# Patient Record
Sex: MALE | Race: WHITE
[De-identification: names, ages, dates, MRNs, and addresses within clinical notes are randomized per-mention and may not be internally consistent; named-entity substitution may affect disease eponyms.]

---

## 2017-07-14 ENCOUNTER — HOSPITAL ENCOUNTER (OUTPATIENT)
Dept: HOSPITAL 19 - COL.VAS | Age: 52
End: 2017-07-14
Payer: COMMERCIAL

## 2017-07-14 DIAGNOSIS — F17.200: ICD-10-CM

## 2017-07-14 DIAGNOSIS — I73.9: Primary | ICD-10-CM

## 2019-03-24 ENCOUNTER — HOSPITAL ENCOUNTER (INPATIENT)
Dept: HOSPITAL 19 - COL.ER | Age: 54
LOS: 3 days | Discharge: HOME | DRG: 871 | End: 2019-03-27
Payer: COMMERCIAL

## 2019-03-24 VITALS — TEMPERATURE: 98.3 F | DIASTOLIC BLOOD PRESSURE: 64 MMHG | HEART RATE: 108 BPM | SYSTOLIC BLOOD PRESSURE: 124 MMHG

## 2019-03-24 VITALS — TEMPERATURE: 97.5 F | HEART RATE: 89 BPM | SYSTOLIC BLOOD PRESSURE: 91 MMHG | DIASTOLIC BLOOD PRESSURE: 57 MMHG

## 2019-03-24 VITALS — HEIGHT: 67.01 IN | WEIGHT: 211.2 LBS | BODY MASS INDEX: 33.15 KG/M2

## 2019-03-24 DIAGNOSIS — K44.9: ICD-10-CM

## 2019-03-24 DIAGNOSIS — A41.9: Primary | ICD-10-CM

## 2019-03-24 DIAGNOSIS — Z88.2: ICD-10-CM

## 2019-03-24 DIAGNOSIS — Z87.891: ICD-10-CM

## 2019-03-24 DIAGNOSIS — Z86.718: ICD-10-CM

## 2019-03-24 DIAGNOSIS — J12.3: ICD-10-CM

## 2019-03-24 DIAGNOSIS — R19.7: ICD-10-CM

## 2019-03-24 DIAGNOSIS — J96.01: ICD-10-CM

## 2019-03-24 LAB
ALBUMIN SERPL-MCNC: 4.1 GM/DL (ref 3.5–5)
ALP SERPL-CCNC: 90 U/L (ref 50–136)
ALT SERPL-CCNC: 28 U/L (ref 21–72)
ANION GAP SERPL CALC-SCNC: 11 MMOL/L (ref 7–16)
AST SERPL-CCNC: 26 U/L (ref 15–37)
BASE EXCESS BLDA CALC-SCNC: -4.3 MMOL/L (ref -2–2)
BILIRUB SERPL-MCNC: 1.3 MG/DL (ref 0–1)
BUN SERPL-MCNC: 21 MG/DL (ref 9–20)
CALCIUM SERPL-MCNC: 9.1 MG/DL (ref 8.4–10.2)
CHLORIDE SERPL-SCNC: 104 MMOL/L (ref 98–107)
CO2 BLDA-SCNC: 19.8 MMOL/L
CO2 SERPL-SCNC: 22 MMOL/L (ref 22–30)
CREAT SERPL-SCNC: 0.89 MG/DL (ref 0.66–1.25)
CRP SERPL-MCNC: 16.5 MG/DL (ref 0–0.9)
EOSINOPHIL NFR BLD: 1 % (ref 0–4)
ERYTHROCYTE [DISTWIDTH] IN BLOOD BY AUTOMATED COUNT: 15.3 % (ref 11.5–14.5)
GLUCOSE SERPL-MCNC: 111 MG/DL (ref 74–106)
HCO3 BLDA-SCNC: 18.9 MEQ/L (ref 22–26)
HCT VFR BLD AUTO: 46.1 % (ref 42–52)
HGB BLD-MCNC: 15.5 G/DL (ref 13.5–18)
INHALED O2 CONCENTRATION: 28 %
LYMPHOCYTES NFR BLD MANUAL: 14 % (ref 20–51)
MCH RBC QN AUTO: 29 PG (ref 27–31)
MCHC RBC AUTO-ENTMCNC: 34 G/DL (ref 33–37)
MCV RBC AUTO: 85 FL (ref 80–100)
MONOCYTES NFR BLD: 7 % (ref 1.7–9.3)
NEUTS SEG NFR BLD MANUAL: 78 % (ref 42–75.2)
PCO2 BLDA: 29.9 MMHG (ref 35–45)
PH UR STRIP.AUTO: 5 [PH] (ref 5–8)
PLATELET # BLD AUTO: 220 K/MM3 (ref 130–400)
PLATELET BLD QL SMEAR: NORMAL
PMV BLD AUTO: 10.3 FL (ref 7.4–10.4)
PO2 BLDA: 111.7 MMHG (ref 80–100)
POTASSIUM SERPL-SCNC: 4.2 MMOL/L (ref 3.4–5)
PROT SERPL-MCNC: 8.2 GM/DL (ref 6.4–8.2)
RBC # BLD AUTO: 5.44 M/MM3 (ref 4.2–5.6)
RBC # UR: (no result) /HPF
SAO2 % BLDA: 97.5 % (ref 92–100)
SODIUM SERPL-SCNC: 137 MMOL/L (ref 137–145)
SP GR UR STRIP.AUTO: 1.04 (ref 1–1.03)
SQUAMOUS # URNS: (no result) /HPF
URN COLLECT METHOD CLASS: (no result)

## 2019-03-24 PROCEDURE — A9284 NON-ELECTRONIC SPIROMETER: HCPCS

## 2019-03-24 PROCEDURE — A4216 STERILE WATER/SALINE, 10 ML: HCPCS

## 2019-03-24 NOTE — NUR
Pt arrived to room 351, transferred per stretcher by ED staff. Pt awake, a&o,
cooperative c cares; wife at bedside. Pt/wife oriented to room, unit policies
et current POC. Questions invited et answered, both verbalize understanding.
Pt/wi deny needs. Call light in reach. Will continue c admit process.

## 2019-03-25 VITALS — TEMPERATURE: 98.4 F | DIASTOLIC BLOOD PRESSURE: 70 MMHG | SYSTOLIC BLOOD PRESSURE: 95 MMHG | HEART RATE: 95 BPM

## 2019-03-25 VITALS — DIASTOLIC BLOOD PRESSURE: 59 MMHG | TEMPERATURE: 99.4 F | SYSTOLIC BLOOD PRESSURE: 104 MMHG | HEART RATE: 116 BPM

## 2019-03-25 VITALS — HEART RATE: 98 BPM | DIASTOLIC BLOOD PRESSURE: 63 MMHG | SYSTOLIC BLOOD PRESSURE: 106 MMHG | TEMPERATURE: 98.4 F

## 2019-03-25 VITALS — SYSTOLIC BLOOD PRESSURE: 100 MMHG | HEART RATE: 79 BPM | TEMPERATURE: 98.2 F | DIASTOLIC BLOOD PRESSURE: 51 MMHG

## 2019-03-25 VITALS — DIASTOLIC BLOOD PRESSURE: 79 MMHG | SYSTOLIC BLOOD PRESSURE: 123 MMHG | TEMPERATURE: 97.6 F | HEART RATE: 95 BPM

## 2019-03-25 LAB
ANION GAP SERPL CALC-SCNC: 8 MMOL/L (ref 7–16)
BASOPHILS # BLD: 0 10*3/UL (ref 0–0.2)
BASOPHILS NFR BLD AUTO: 0.2 % (ref 0–2)
BUN SERPL-MCNC: 15 MG/DL (ref 9–20)
CALCIUM SERPL-MCNC: 8.9 MG/DL (ref 8.4–10.2)
CHLORIDE SERPL-SCNC: 109 MMOL/L (ref 98–107)
CO2 SERPL-SCNC: 22 MMOL/L (ref 22–30)
CREAT SERPL-SCNC: 0.59 MG/DL (ref 0.66–1.25)
EOSINOPHIL # BLD: 0 10*3/UL (ref 0–0.7)
EOSINOPHIL NFR BLD: 0.1 % (ref 0–4)
ERYTHROCYTE [DISTWIDTH] IN BLOOD BY AUTOMATED COUNT: 14.9 % (ref 11.5–14.5)
GLUCOSE SERPL-MCNC: 105 MG/DL (ref 74–106)
GRANULOCYTES # BLD AUTO: 74.7 % (ref 42.2–75.2)
HCT VFR BLD AUTO: 42.6 % (ref 42–52)
HGB BLD-MCNC: 14.2 G/DL (ref 13.5–18)
LYMPHOCYTES # BLD: 1.5 10*3/UL (ref 1.2–3.4)
LYMPHOCYTES NFR BLD: 13.6 % (ref 20–51)
MCH RBC QN AUTO: 28 PG (ref 27–31)
MCHC RBC AUTO-ENTMCNC: 33 G/DL (ref 33–37)
MCV RBC AUTO: 85 FL (ref 80–100)
MONOCYTES # BLD: 1.2 10*3/UL (ref 0.1–0.6)
MONOCYTES NFR BLD AUTO: 10.9 % (ref 1.7–9.3)
NEUTROPHILS # BLD: 8.4 10*3/UL (ref 1.4–6.5)
PLATELET # BLD AUTO: 187 K/MM3 (ref 130–400)
PMV BLD AUTO: 10.6 FL (ref 7.4–10.4)
POTASSIUM SERPL-SCNC: 4 MMOL/L (ref 3.4–5)
RBC # BLD AUTO: 5 M/MM3 (ref 4.2–5.6)
SODIUM SERPL-SCNC: 139 MMOL/L (ref 137–145)

## 2019-03-25 NOTE — NUR
SW met with patient about discharge planning. Patient lives independently at
home with his wife. Patient's PCP is Dr. Cordero and he obtains prescriptions
from [facility]. Patient uses [employer] but no other DME is reported and he does
not use any home health services. Patient does not have a DPOA and is not
interested in completing one at this time. SW does not anticipate any
discharge needs.

## 2019-03-25 NOTE — NUR
PT SITTING AT SIDE OF BED. PT A/O X4, COUGHING. CALLED DR. BAUTISTA TO GET DIET
FOR PT. CALL LIGHT WITHIN REACH.

## 2019-03-25 NOTE — NUR
PT IN BED WITH HOB ELEVATED. PT ATE SUPPER AND % OF MEAL. PT ADVISED
THAT THAT WAS THE FIRST TIME SINCE THURSDAY THAT HE FELT LIKE EATING AND
ACTUALLY ATE ALL HIS FOOD. PT DENIES PAIN OR DISCOMFORT. PT WAS EDUCATED ON
MEDICATIONS TAKING. PT CONTINUES TO COUGH AND NOSE STILL HAS CONGESTION. PT
USES O2 AT 3L/OXYMASK. PT ADVISED THAT HE USES A CPAP AT NIGHT, BUT SINCE HIS
NOSE IS STUFF-UP HE IS UNABLE TO USE HIS CPAP AT NIGHT.  PT HAS NO NEEDS OR
CONCERNS, CALL LIGHT WITHIN REACH.

## 2019-03-25 NOTE — NUR
Shift assessment complete. Pt resting in bed, awake, a&o, cooperative c cares.
Pt denies pain or other c/o at this time. INT patent. Tele in place. PT denies
needs. Call light in reach, will monitor.

## 2019-03-26 VITALS — TEMPERATURE: 98.2 F | DIASTOLIC BLOOD PRESSURE: 74 MMHG | HEART RATE: 107 BPM | SYSTOLIC BLOOD PRESSURE: 125 MMHG

## 2019-03-26 VITALS — SYSTOLIC BLOOD PRESSURE: 111 MMHG | TEMPERATURE: 98.9 F | HEART RATE: 97 BPM | DIASTOLIC BLOOD PRESSURE: 67 MMHG

## 2019-03-26 VITALS — SYSTOLIC BLOOD PRESSURE: 118 MMHG | TEMPERATURE: 98.1 F | HEART RATE: 120 BPM | DIASTOLIC BLOOD PRESSURE: 65 MMHG

## 2019-03-26 VITALS — SYSTOLIC BLOOD PRESSURE: 109 MMHG | HEART RATE: 87 BPM | TEMPERATURE: 100 F | DIASTOLIC BLOOD PRESSURE: 57 MMHG

## 2019-03-26 VITALS — HEART RATE: 100 BPM | TEMPERATURE: 98.5 F | DIASTOLIC BLOOD PRESSURE: 66 MMHG | SYSTOLIC BLOOD PRESSURE: 104 MMHG

## 2019-03-26 VITALS — HEART RATE: 107 BPM | SYSTOLIC BLOOD PRESSURE: 117 MMHG | TEMPERATURE: 98.5 F | DIASTOLIC BLOOD PRESSURE: 74 MMHG

## 2019-03-26 LAB
ANION GAP SERPL CALC-SCNC: 8 MMOL/L (ref 7–16)
BASOPHILS # BLD: 0 10*3/UL (ref 0–0.2)
BASOPHILS NFR BLD AUTO: 0.5 % (ref 0–2)
BUN SERPL-MCNC: 19 MG/DL (ref 9–20)
CALCIUM SERPL-MCNC: 8.5 MG/DL (ref 8.4–10.2)
CHLORIDE SERPL-SCNC: 105 MMOL/L (ref 98–107)
CO2 SERPL-SCNC: 24 MMOL/L (ref 22–30)
CREAT SERPL-SCNC: 0.79 MG/DL (ref 0.66–1.25)
EOSINOPHIL # BLD: 0.1 10*3/UL (ref 0–0.7)
EOSINOPHIL NFR BLD: 0.9 % (ref 0–4)
ERYTHROCYTE [DISTWIDTH] IN BLOOD BY AUTOMATED COUNT: 15 % (ref 11.5–14.5)
GLUCOSE SERPL-MCNC: 110 MG/DL (ref 74–106)
GRANULOCYTES # BLD AUTO: 65.5 % (ref 42.2–75.2)
HCT VFR BLD AUTO: 37 % (ref 42–52)
HGB BLD-MCNC: 12.6 G/DL (ref 13.5–18)
LYMPHOCYTES # BLD: 1.6 10*3/UL (ref 1.2–3.4)
LYMPHOCYTES NFR BLD: 19.9 % (ref 20–51)
MCH RBC QN AUTO: 29 PG (ref 27–31)
MCHC RBC AUTO-ENTMCNC: 34 G/DL (ref 33–37)
MCV RBC AUTO: 85 FL (ref 80–100)
MONOCYTES # BLD: 1 10*3/UL (ref 0.1–0.6)
MONOCYTES NFR BLD AUTO: 12.7 % (ref 1.7–9.3)
NEUTROPHILS # BLD: 5.3 10*3/UL (ref 1.4–6.5)
PLATELET # BLD AUTO: 175 K/MM3 (ref 130–400)
PMV BLD AUTO: 11.1 FL (ref 7.4–10.4)
POTASSIUM SERPL-SCNC: 3.9 MMOL/L (ref 3.4–5)
RBC # BLD AUTO: 4.38 M/MM3 (ref 4.2–5.6)
SODIUM SERPL-SCNC: 137 MMOL/L (ref 137–145)

## 2019-03-26 NOTE — NUR
Patient resting in bed, given tamiflu. Assessment cmopleted, denies pain. LUng
sounds clear, abdminal sounds active, ulses +3. SBA/independent. Alert and
oriented. ON droplet precautions for pending RVP and c.diff pending. Given new
jug of water. NO further needs at this time.

## 2019-03-26 NOTE — NUR
Reported on to FRAN[facility]. Assessment: INT in left AC CDI. Shortness of air at
36 resperations per minute.  Sat at 96% on RA. Pulse 108. Productive cough
with sticky pale yellow sputum.  Pt complains of diffuse ache throughout body
with headache. Pain 4. Does not want PRN APAP.  Telemetry on.  No SCDs/TEDs.

## 2019-03-27 VITALS — HEART RATE: 89 BPM | SYSTOLIC BLOOD PRESSURE: 135 MMHG | TEMPERATURE: 97.9 F | DIASTOLIC BLOOD PRESSURE: 86 MMHG

## 2019-03-27 VITALS — DIASTOLIC BLOOD PRESSURE: 49 MMHG | SYSTOLIC BLOOD PRESSURE: 97 MMHG | TEMPERATURE: 98.9 F | HEART RATE: 107 BPM

## 2019-03-27 VITALS — DIASTOLIC BLOOD PRESSURE: 60 MMHG | HEART RATE: 105 BPM | SYSTOLIC BLOOD PRESSURE: 120 MMHG | TEMPERATURE: 98.5 F

## 2019-03-27 LAB
ANION GAP SERPL CALC-SCNC: 7 MMOL/L (ref 7–16)
BUN SERPL-MCNC: 8 MG/DL (ref 9–20)
CALCIUM SERPL-MCNC: 8.2 MG/DL (ref 8.4–10.2)
CHLORIDE SERPL-SCNC: 110 MMOL/L (ref 98–107)
CO2 SERPL-SCNC: 23 MMOL/L (ref 22–30)
CREAT SERPL-SCNC: 0.67 MG/DL (ref 0.66–1.25)
EOSINOPHIL NFR BLD: 3 % (ref 0–4)
ERYTHROCYTE [DISTWIDTH] IN BLOOD BY AUTOMATED COUNT: 15 % (ref 11.5–14.5)
GLUCOSE SERPL-MCNC: 112 MG/DL (ref 74–106)
HCT VFR BLD AUTO: 37.1 % (ref 42–52)
HGB BLD-MCNC: 12.6 G/DL (ref 13.5–18)
LYMPHOCYTES NFR BLD MANUAL: 39 % (ref 20–51)
MCH RBC QN AUTO: 29 PG (ref 27–31)
MCHC RBC AUTO-ENTMCNC: 34 G/DL (ref 33–37)
MCV RBC AUTO: 84 FL (ref 80–100)
MONOCYTES NFR BLD: 12 % (ref 1.7–9.3)
NEUTS BAND NFR BLD: 10 % (ref 0–10)
NEUTS SEG NFR BLD MANUAL: 36 % (ref 42–75.2)
PLATELET # BLD AUTO: 185 K/MM3 (ref 130–400)
PLATELET BLD QL SMEAR: NORMAL
PMV BLD AUTO: 10.3 FL (ref 7.4–10.4)
POTASSIUM SERPL-SCNC: 3.6 MMOL/L (ref 3.4–5)
RBC # BLD AUTO: 4.42 M/MM3 (ref 4.2–5.6)
SODIUM SERPL-SCNC: 139 MMOL/L (ref 137–145)

## 2019-03-27 NOTE — NUR
Pt is awake and A/Ox4, sitting up in bed. He remains on room air. He denies
pain or discomfort. IVF are infusing into left AC without difficulty. Resp.
are even and unlabored at rest. Pt does report productive cough, thin and
white. Pt is up as tolerated in room. Pt denies any other needs, will monitor.

## 2019-03-27 NOTE — NUR
Pt slept on/off last night. Continues to have cough. VSS, afebrile. NO rpeorts
of pain. IVF infusing at 125 mls/hr.

## 2019-03-27 NOTE — NUR
Pt was discharged home from hospital. All discharge instructions and paperwork
was reviewed with pt and wife, both expressed understanding and had no
questions. New prescriptions sent electronically to pharm. Saline lock
removed, catheter tip intact. Pt was escorted out of facility by staff.

## 2020-06-12 ENCOUNTER — HOSPITAL ENCOUNTER (OUTPATIENT)
Dept: HOSPITAL 19 - WSPT | Age: 55
LOS: 13 days | Discharge: HOME | End: 2020-06-25
Payer: COMMERCIAL

## 2020-06-12 DIAGNOSIS — M16.11: ICD-10-CM

## 2020-06-12 DIAGNOSIS — G60.0: Primary | ICD-10-CM

## 2020-07-22 ENCOUNTER — HOSPITAL ENCOUNTER (OUTPATIENT)
Dept: HOSPITAL 19 - ZCOL.LAB | Age: 55
End: 2020-07-22
Attending: FAMILY MEDICINE
Payer: COMMERCIAL

## 2020-07-22 DIAGNOSIS — Z20.828: Primary | ICD-10-CM

## 2021-02-26 ENCOUNTER — HOSPITAL ENCOUNTER (OUTPATIENT)
Dept: HOSPITAL 19 - EUO | Age: 56
Discharge: HOME | End: 2021-02-26
Attending: INTERNAL MEDICINE
Payer: COMMERCIAL

## 2021-02-26 VITALS — SYSTOLIC BLOOD PRESSURE: 142 MMHG | DIASTOLIC BLOOD PRESSURE: 77 MMHG | HEART RATE: 77 BPM | TEMPERATURE: 97.8 F

## 2021-02-26 VITALS — HEIGHT: 67.01 IN | WEIGHT: 186.73 LBS | BODY MASS INDEX: 29.31 KG/M2

## 2021-02-26 DIAGNOSIS — Z79.899: ICD-10-CM

## 2021-02-26 DIAGNOSIS — L50.1: Primary | ICD-10-CM

## 2021-03-26 ENCOUNTER — HOSPITAL ENCOUNTER (OUTPATIENT)
Dept: HOSPITAL 19 - EUO | Age: 56
Discharge: HOME | End: 2021-03-26
Attending: INTERNAL MEDICINE
Payer: COMMERCIAL

## 2021-03-26 VITALS — SYSTOLIC BLOOD PRESSURE: 134 MMHG | TEMPERATURE: 98.1 F | HEART RATE: 83 BPM | DIASTOLIC BLOOD PRESSURE: 75 MMHG

## 2021-03-26 VITALS — BODY MASS INDEX: 29.17 KG/M2 | WEIGHT: 185.85 LBS | HEIGHT: 67.01 IN

## 2021-03-26 DIAGNOSIS — Z79.899: ICD-10-CM

## 2021-03-26 DIAGNOSIS — L50.1: Primary | ICD-10-CM

## 2021-04-23 ENCOUNTER — HOSPITAL ENCOUNTER (OUTPATIENT)
Dept: HOSPITAL 19 - EUO | Age: 56
Discharge: HOME | End: 2021-04-23
Attending: INTERNAL MEDICINE
Payer: COMMERCIAL

## 2021-04-23 VITALS — TEMPERATURE: 98.1 F | HEART RATE: 94 BPM | DIASTOLIC BLOOD PRESSURE: 65 MMHG | SYSTOLIC BLOOD PRESSURE: 12 MMHG

## 2021-04-23 VITALS — HEIGHT: 67.01 IN | WEIGHT: 185.41 LBS | BODY MASS INDEX: 29.1 KG/M2

## 2021-04-23 DIAGNOSIS — L50.1: Primary | ICD-10-CM

## 2021-05-21 ENCOUNTER — HOSPITAL ENCOUNTER (OUTPATIENT)
Dept: HOSPITAL 19 - EUO | Age: 56
Discharge: HOME | End: 2021-05-21
Attending: INTERNAL MEDICINE
Payer: COMMERCIAL

## 2021-05-21 VITALS — SYSTOLIC BLOOD PRESSURE: 118 MMHG | HEART RATE: 85 BPM | DIASTOLIC BLOOD PRESSURE: 83 MMHG | TEMPERATURE: 98.5 F

## 2021-05-21 VITALS — BODY MASS INDEX: 28.3 KG/M2 | WEIGHT: 180.34 LBS | HEIGHT: 67.01 IN

## 2021-05-21 DIAGNOSIS — L50.1: Primary | ICD-10-CM

## 2021-05-21 DIAGNOSIS — Z79.899: ICD-10-CM

## 2023-01-24 ENCOUNTER — HOSPITAL ENCOUNTER (OUTPATIENT)
Dept: HOSPITAL 6 - RAD | Age: 58
End: 2023-01-24
Payer: COMMERCIAL

## 2023-01-24 DIAGNOSIS — D72.829: ICD-10-CM

## 2023-01-24 DIAGNOSIS — K80.20: ICD-10-CM

## 2023-01-24 DIAGNOSIS — N20.0: Primary | ICD-10-CM
